# Patient Record
Sex: MALE | Race: WHITE | NOT HISPANIC OR LATINO | URBAN - METROPOLITAN AREA
[De-identification: names, ages, dates, MRNs, and addresses within clinical notes are randomized per-mention and may not be internally consistent; named-entity substitution may affect disease eponyms.]

---

## 2022-05-16 ENCOUNTER — INPATIENT (INPATIENT)
Facility: HOSPITAL | Age: 62
LOS: 0 days | Discharge: HOME | DRG: 292 | End: 2022-05-17
Attending: INTERNAL MEDICINE | Admitting: INTERNAL MEDICINE
Payer: COMMERCIAL

## 2022-05-16 VITALS
TEMPERATURE: 98 F | OXYGEN SATURATION: 95 % | WEIGHT: 210.1 LBS | DIASTOLIC BLOOD PRESSURE: 74 MMHG | SYSTOLIC BLOOD PRESSURE: 145 MMHG | HEIGHT: 73 IN | HEART RATE: 58 BPM | RESPIRATION RATE: 18 BRPM

## 2022-05-16 DIAGNOSIS — R06.00 DYSPNEA, UNSPECIFIED: ICD-10-CM

## 2022-05-16 LAB
ADD ON TEST-SPECIMEN IN LAB: SIGNIFICANT CHANGE UP
ALBUMIN SERPL ELPH-MCNC: 3.7 G/DL — SIGNIFICANT CHANGE UP (ref 3.3–5)
ALP SERPL-CCNC: 62 U/L — SIGNIFICANT CHANGE UP (ref 40–120)
ALT FLD-CCNC: 79 U/L — HIGH (ref 12–78)
ANION GAP SERPL CALC-SCNC: 8 MMOL/L — SIGNIFICANT CHANGE UP (ref 5–17)
APTT BLD: 30.6 SEC — SIGNIFICANT CHANGE UP (ref 27.5–35.5)
AST SERPL-CCNC: 77 U/L — HIGH (ref 15–37)
BASOPHILS # BLD AUTO: 0.02 K/UL — SIGNIFICANT CHANGE UP (ref 0–0.2)
BASOPHILS NFR BLD AUTO: 0.3 % — SIGNIFICANT CHANGE UP (ref 0–2)
BILIRUB SERPL-MCNC: 0.7 MG/DL — SIGNIFICANT CHANGE UP (ref 0.2–1.2)
BUN SERPL-MCNC: 22 MG/DL — SIGNIFICANT CHANGE UP (ref 7–23)
CALCIUM SERPL-MCNC: 9.1 MG/DL — SIGNIFICANT CHANGE UP (ref 8.5–10.1)
CHLORIDE SERPL-SCNC: 107 MMOL/L — SIGNIFICANT CHANGE UP (ref 96–108)
CO2 SERPL-SCNC: 25 MMOL/L — SIGNIFICANT CHANGE UP (ref 22–31)
CREAT SERPL-MCNC: 1.29 MG/DL — SIGNIFICANT CHANGE UP (ref 0.5–1.3)
EGFR: 63 ML/MIN/1.73M2 — SIGNIFICANT CHANGE UP
EOSINOPHIL # BLD AUTO: 0.02 K/UL — SIGNIFICANT CHANGE UP (ref 0–0.5)
EOSINOPHIL NFR BLD AUTO: 0.3 % — SIGNIFICANT CHANGE UP (ref 0–6)
GLUCOSE SERPL-MCNC: 105 MG/DL — HIGH (ref 70–99)
HCT VFR BLD CALC: 41.4 % — SIGNIFICANT CHANGE UP (ref 39–50)
HGB BLD-MCNC: 14 G/DL — SIGNIFICANT CHANGE UP (ref 13–17)
IMM GRANULOCYTES NFR BLD AUTO: 0.4 % — SIGNIFICANT CHANGE UP (ref 0–1.5)
INR BLD: 1.11 RATIO — SIGNIFICANT CHANGE UP (ref 0.88–1.16)
LYMPHOCYTES # BLD AUTO: 1.1 K/UL — SIGNIFICANT CHANGE UP (ref 1–3.3)
LYMPHOCYTES # BLD AUTO: 15.5 % — SIGNIFICANT CHANGE UP (ref 13–44)
MCHC RBC-ENTMCNC: 33 PG — SIGNIFICANT CHANGE UP (ref 27–34)
MCHC RBC-ENTMCNC: 33.8 GM/DL — SIGNIFICANT CHANGE UP (ref 32–36)
MCV RBC AUTO: 97.6 FL — SIGNIFICANT CHANGE UP (ref 80–100)
MONOCYTES # BLD AUTO: 0.74 K/UL — SIGNIFICANT CHANGE UP (ref 0–0.9)
MONOCYTES NFR BLD AUTO: 10.4 % — SIGNIFICANT CHANGE UP (ref 2–14)
NEUTROPHILS # BLD AUTO: 5.18 K/UL — SIGNIFICANT CHANGE UP (ref 1.8–7.4)
NEUTROPHILS NFR BLD AUTO: 73.1 % — SIGNIFICANT CHANGE UP (ref 43–77)
NT-PROBNP SERPL-SCNC: 1202 PG/ML — HIGH (ref 0–125)
PLATELET # BLD AUTO: 148 K/UL — LOW (ref 150–400)
POTASSIUM SERPL-MCNC: 4.1 MMOL/L — SIGNIFICANT CHANGE UP (ref 3.5–5.3)
POTASSIUM SERPL-SCNC: 4.1 MMOL/L — SIGNIFICANT CHANGE UP (ref 3.5–5.3)
PROT SERPL-MCNC: 7.5 GM/DL — SIGNIFICANT CHANGE UP (ref 6–8.3)
PROTHROM AB SERPL-ACNC: 12.9 SEC — SIGNIFICANT CHANGE UP (ref 10.5–13.4)
RAPID RVP RESULT: SIGNIFICANT CHANGE UP
RBC # BLD: 4.24 M/UL — SIGNIFICANT CHANGE UP (ref 4.2–5.8)
RBC # FLD: 13 % — SIGNIFICANT CHANGE UP (ref 10.3–14.5)
SARS-COV-2 RNA SPEC QL NAA+PROBE: SIGNIFICANT CHANGE UP
SODIUM SERPL-SCNC: 140 MMOL/L — SIGNIFICANT CHANGE UP (ref 135–145)
TROPONIN I, HIGH SENSITIVITY RESULT: 16.57 NG/L — SIGNIFICANT CHANGE UP
TROPONIN I, HIGH SENSITIVITY RESULT: 17.29 NG/L — SIGNIFICANT CHANGE UP
TROPONIN I, HIGH SENSITIVITY RESULT: 17.41 NG/L — SIGNIFICANT CHANGE UP
TROPONIN I, HIGH SENSITIVITY RESULT: 18.61 NG/L — SIGNIFICANT CHANGE UP
WBC # BLD: 7.09 K/UL — SIGNIFICANT CHANGE UP (ref 3.8–10.5)
WBC # FLD AUTO: 7.09 K/UL — SIGNIFICANT CHANGE UP (ref 3.8–10.5)

## 2022-05-16 PROCEDURE — 80048 BASIC METABOLIC PNL TOTAL CA: CPT

## 2022-05-16 PROCEDURE — 99285 EMERGENCY DEPT VISIT HI MDM: CPT

## 2022-05-16 PROCEDURE — 36415 COLL VENOUS BLD VENIPUNCTURE: CPT

## 2022-05-16 PROCEDURE — 99223 1ST HOSP IP/OBS HIGH 75: CPT

## 2022-05-16 PROCEDURE — 93010 ELECTROCARDIOGRAM REPORT: CPT | Mod: 76

## 2022-05-16 PROCEDURE — 93017 CV STRESS TEST TRACING ONLY: CPT

## 2022-05-16 PROCEDURE — 84484 ASSAY OF TROPONIN QUANT: CPT

## 2022-05-16 PROCEDURE — A9500: CPT

## 2022-05-16 PROCEDURE — G0378: CPT

## 2022-05-16 PROCEDURE — 93005 ELECTROCARDIOGRAM TRACING: CPT

## 2022-05-16 PROCEDURE — 80061 LIPID PANEL: CPT

## 2022-05-16 PROCEDURE — 86803 HEPATITIS C AB TEST: CPT

## 2022-05-16 PROCEDURE — 78452 HT MUSCLE IMAGE SPECT MULT: CPT

## 2022-05-16 PROCEDURE — 71045 X-RAY EXAM CHEST 1 VIEW: CPT | Mod: 26

## 2022-05-16 RX ORDER — ATORVASTATIN CALCIUM 80 MG/1
10 TABLET, FILM COATED ORAL AT BEDTIME
Refills: 0 | Status: DISCONTINUED | OUTPATIENT
Start: 2022-05-16 | End: 2022-05-17

## 2022-05-16 RX ORDER — BUPRENORPHINE AND NALOXONE 2; .5 MG/1; MG/1
1 TABLET SUBLINGUAL
Qty: 0 | Refills: 0 | DISCHARGE

## 2022-05-16 RX ORDER — FUROSEMIDE 40 MG
40 TABLET ORAL DAILY
Refills: 0 | Status: DISCONTINUED | OUTPATIENT
Start: 2022-05-16 | End: 2022-05-17

## 2022-05-16 RX ORDER — FENOFIBRATE,MICRONIZED 130 MG
145 CAPSULE ORAL DAILY
Refills: 0 | Status: DISCONTINUED | OUTPATIENT
Start: 2022-05-16 | End: 2022-05-17

## 2022-05-16 RX ORDER — METOPROLOL TARTRATE 50 MG
12.5 TABLET ORAL
Refills: 0 | Status: DISCONTINUED | OUTPATIENT
Start: 2022-05-16 | End: 2022-05-16

## 2022-05-16 RX ORDER — HYDROCHLOROTHIAZIDE 25 MG
25 TABLET ORAL DAILY
Refills: 0 | Status: DISCONTINUED | OUTPATIENT
Start: 2022-05-16 | End: 2022-05-17

## 2022-05-16 RX ORDER — LANOLIN ALCOHOL/MO/W.PET/CERES
3 CREAM (GRAM) TOPICAL AT BEDTIME
Refills: 0 | Status: DISCONTINUED | OUTPATIENT
Start: 2022-05-16 | End: 2022-05-17

## 2022-05-16 RX ORDER — METOPROLOL TARTRATE 50 MG
50 TABLET ORAL DAILY
Refills: 0 | Status: DISCONTINUED | OUTPATIENT
Start: 2022-05-16 | End: 2022-05-17

## 2022-05-16 RX ORDER — HYDRALAZINE HCL 50 MG
100 TABLET ORAL
Refills: 0 | Status: DISCONTINUED | OUTPATIENT
Start: 2022-05-16 | End: 2022-05-17

## 2022-05-16 RX ORDER — ONDANSETRON 8 MG/1
4 TABLET, FILM COATED ORAL EVERY 6 HOURS
Refills: 0 | Status: DISCONTINUED | OUTPATIENT
Start: 2022-05-16 | End: 2022-05-17

## 2022-05-16 RX ORDER — ACETAMINOPHEN 500 MG
650 TABLET ORAL EVERY 6 HOURS
Refills: 0 | Status: DISCONTINUED | OUTPATIENT
Start: 2022-05-16 | End: 2022-05-17

## 2022-05-16 RX ORDER — METOPROLOL TARTRATE 50 MG
100 TABLET ORAL AT BEDTIME
Refills: 0 | Status: DISCONTINUED | OUTPATIENT
Start: 2022-05-16 | End: 2022-05-17

## 2022-05-16 RX ORDER — BUPRENORPHINE AND NALOXONE 2; .5 MG/1; MG/1
1 TABLET SUBLINGUAL DAILY
Refills: 0 | Status: DISCONTINUED | OUTPATIENT
Start: 2022-05-16 | End: 2022-05-17

## 2022-05-16 RX ORDER — FUROSEMIDE 40 MG
20 TABLET ORAL ONCE
Refills: 0 | Status: COMPLETED | OUTPATIENT
Start: 2022-05-16 | End: 2022-05-16

## 2022-05-16 RX ORDER — LOSARTAN POTASSIUM 100 MG/1
100 TABLET, FILM COATED ORAL DAILY
Refills: 0 | Status: DISCONTINUED | OUTPATIENT
Start: 2022-05-16 | End: 2022-05-17

## 2022-05-16 RX ORDER — METOPROLOL TARTRATE 50 MG
1 TABLET ORAL
Qty: 0 | Refills: 0 | DISCHARGE

## 2022-05-16 RX ORDER — ENOXAPARIN SODIUM 100 MG/ML
40 INJECTION SUBCUTANEOUS EVERY 24 HOURS
Refills: 0 | Status: DISCONTINUED | OUTPATIENT
Start: 2022-05-16 | End: 2022-05-17

## 2022-05-16 RX ADMIN — Medication 3 MILLIGRAM(S): at 21:53

## 2022-05-16 RX ADMIN — Medication 650 MILLIGRAM(S): at 12:21

## 2022-05-16 RX ADMIN — Medication 650 MILLIGRAM(S): at 17:47

## 2022-05-16 RX ADMIN — ENOXAPARIN SODIUM 40 MILLIGRAM(S): 100 INJECTION SUBCUTANEOUS at 15:23

## 2022-05-16 RX ADMIN — Medication 20 MILLIGRAM(S): at 12:21

## 2022-05-16 RX ADMIN — ATORVASTATIN CALCIUM 10 MILLIGRAM(S): 80 TABLET, FILM COATED ORAL at 21:53

## 2022-05-16 RX ADMIN — Medication 100 MILLIGRAM(S): at 21:53

## 2022-05-16 NOTE — PATIENT PROFILE ADULT - FALL HARM RISK - UNIVERSAL INTERVENTIONS
Bed in lowest position, wheels locked, appropriate side rails in place/Call bell, personal items and telephone in reach/Instruct patient to call for assistance before getting out of bed or chair/Non-slip footwear when patient is out of bed/[place] to call system/Physically safe environment - no spills, clutter or unnecessary equipment/Purposeful Proactive Rounding/Room/bathroom lighting operational, light cord in reach

## 2022-05-16 NOTE — CONSULT NOTE ADULT - SUBJECTIVE AND OBJECTIVE BOX
60 yo male w/PMH of presents to the ED for SOB since last night, worsened this morning. Pt reports mild body aches over the weekend and some SOB last night which he notes worsened this morning. Also reports feeling anxious this morning. States SOB worse lying flat. His exercise capacity has not been impaired recently. Wife present and admits to ETOH and large meals that they enjoy. He aslo snores and night and possibly stops breathing. He will be adm to r/o CHF vs SOB as an anginal equivalent, he defenetly need s stress test before he leaves.  PCP aware of adm and the need for sleep study as outpt. (16 May 2022 14:11)    Admitted for dyspnea.    62 y/o w/     HTN  HLP, hyper TG  obesity - BMI = 30  no cardiac history    Reviewed symptoms w/ pt.  Pt reports onset of mild dyspne (4-5/10) today.  Reports orthopnea, no PND.  No LE edema.  no rapid weight gain ab distension.  Vague pressure type pain in chest on questioning  not related to exertion.  No palpitation, syncope.  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  Feels better after IV lasix dose & diuresis.  Feels back to baseline.    PAST MEDICAL AND SURGICAL HISTORY:  PAST MEDICAL & SURGICAL HISTORY:  HTN (hypertension)          ALLERGIES:  Allergies    penicillins (Unknown)    Intolerances        SOCIAL HISTORY:  Social History:  neg smoking,  drink probably more than normal social drinking, neg IDU (16 May 2022 14:11)      FAMILY  HISTORY:  no family history of cardiac disease.      MEDICATIONS:  OUTPATIENT:  Home Medications:  atorvastatin 10 mg oral tablet: 1 tab(s) orally once a day (16 May 2022 13:00)  buprenorphine-naloxone 2 mg-0.5 mg sublingual film: 0.25 (1/4) film(s) sublingual once a day  ***pt uses one-quarter of a film*** (16 May 2022 13:00)  fenofibrate 160 mg oral tablet: 1 tab(s) orally once a day (16 May 2022 13:00)  hydrALAZINE 100 mg oral tablet: 1 tab(s) orally 2 times a day (16 May 2022 13:00)  losartan-hydrochlorothiazide 100 mg-25 mg oral tablet: 1 tab(s) orally once a day (16 May 2022 13:00)  metoprolol succinate 50 mg oral tablet, extended release: 1 tab(s) orally once a day (in the morning) (16 May 2022 13:00)  metoprolol succinate 50 mg oral tablet, extended release: 2 tab(s) orally once a day (in the evening) (16 May 2022 13:00)  Multiple Vitamins oral tablet: 1 tab(s) orally once a day (16 May 2022 13:00)  Tylenol 500 mg oral tablet: 2 tab(s) orally every 6 hours as needed (16 May 2022 13:00)  Vitamin D3 400 intl units (10 mcg) oral tablet: 1 tab(s) orally once a day (16 May 2022 13:00)      INPATIENT:  MEDICATIONS  (STANDING):  atorvastatin 10 milliGRAM(s) Oral at bedtime  buprenorphine 2 mG/naloxone 0.5 mG SL Film 1 Film(s) SubLingual daily  enoxaparin Injectable 40 milliGRAM(s) SubCutaneous every 24 hours  fenofibrate Tablet 145 milliGRAM(s) Oral daily  furosemide   Injectable 40 milliGRAM(s) IV Push daily  hydrALAZINE 100 milliGRAM(s) Oral two times a day  hydrochlorothiazide 25 milliGRAM(s) Oral daily  losartan 100 milliGRAM(s) Oral daily  metoprolol succinate ER 50 milliGRAM(s) Oral daily  metoprolol succinate  milliGRAM(s) Oral at bedtime    MEDICATIONS  (PRN):  acetaminophen     Tablet .. 650 milliGRAM(s) Oral every 6 hours PRN Mild Pain (1 - 3)  ondansetron Injectable 4 milliGRAM(s) IV Push every 6 hours PRN Nausea and/or Vomiting    MEDICATIONS  (PRN):  acetaminophen     Tablet .. 650 milliGRAM(s) Oral every 6 hours PRN Mild Pain (1 - 3)  ondansetron Injectable 4 milliGRAM(s) IV Push every 6 hours PRN Nausea and/or Vomiting      REVIEW OF SYSTEMS:  ===============================  ===============================  CONSTITUTIONAL: No weakness, fevers or chills  EYES/ENT: No visual changes;  No vertigo or throat pain   NECK: No pain or stiffness  RESPIRATORY: No cough, wheezing, hemoptysis; No shortness of breath  CARDIOVASCULAR: No chest pain or palpitations  GASTROINTESTINAL: No abdominal or epigastric pain. No nausea, vomiting, or hematemesis;   No diarrhea or constipation. No melena or hematochezia.  GENITOURINARY: No dysuria, frequency or hematuria  NEUROLOGICAL: No numbness or weakness  SKIN: No itching, burning, rashes, or lesions   All other review of systems is negative unless indicated above    Vital Signs Last 24 Hrs  T(C): 36.9 (16 May 2022 16:30), Max: 37.2 (16 May 2022 12:43)  T(F): 98.5 (16 May 2022 16:30), Max: 99 (16 May 2022 12:43)  HR: 55 (16 May 2022 16:30) (55 - 58)  BP: 144/65 (16 May 2022 16:30) (142/75 - 145/74)  BP(mean): 95 (16 May 2022 13:44) (95 - 95)  RR: 18 (16 May 2022 16:30) (18 - 19)  SpO2: 95% (16 May 2022 16:30) (94% - 95%)    I&O's Summary    16 May 2022 07:01  -  16 May 2022 19:59  --------------------------------------------------------  IN: 0 mL / OUT: 320 mL / NET: -320 mL        I&O's Detail    16 May 2022 07:01  -  16 May 2022 19:59  --------------------------------------------------------  IN:  Total IN: 0 mL    OUT:    Voided (mL): 320 mL  Total OUT: 320 mL    Total NET: -320 mL          PHYSICAL EXAM:    Constitutional: NAD, awake and alert,   HEENT: PERR, EOMI,  No oral cyananosis.  Neck:  supple,  No JVD  Respiratory: Breath sounds are clear bilaterally, No wheezing, rales or rhonchi  Cardiovascular: S1 and S2, regular rate and rhythm, no Murmurs, gallops or rubs  Gastrointestinal: Bowel Sounds present, soft, nontender.   Extremities: No peripheral edema. No clubbing or cyanosis.  Vascular: 2+ peripheral pulses  Neurological: A/O x 3, no focal deficits  Musculoskeletal: no calf tenderness.  Skin: No rashes.    ===============================  ===============================  LABS:                         14.0   7.09  )-----------( 148      ( 16 May 2022 10:19 )             41.4     16 May 2022 10:19    140    |  107    |  22     ----------------------------<  105    4.1     |  25     |  1.29     Ca    9.1        16 May 2022 10:19    TPro  7.5    /  Alb  3.7    /  TBili  0.7    /  DBili  x      /  AST  77     /  ALT  79     /  AlkPhos  62     16 May 2022 10:19    PT/INR - ( 16 May 2022 10:19 )   PT: 12.9 sec;   INR: 1.11 ratio         PTT - ( 16 May 2022 10:19 )  PTT:30.6 sec    THYROID STUDIES:    ===============================  ===============================  CARDIAC BIOMARKERS:  -------  -BNP VALUES:  05-16 @ 10:19  Pro Bnp 1202    -------  -TROPONIN VALUES:   Troponin I, High Sensitivity Result: 18.61 ng/L (05-16-22 @ 17:42)  Troponin I, High Sensitivity Result: 17.29 ng/L (05-16-22 @ 14:03)  Troponin I, High Sensitivity Result: 17.41 ng/L (05-16-22 @ 10:19)    ===============================  ===============================  EKG: NSR, no ischemic changes.    TELE: NSR  ===============================  RADIOLOGY:      ACC: [id number] EXAM:  XR CHEST PORTABLE ROUTINE 1V                          PROCEDURE DATE:  05/16/2022          INTERPRETATION:  Portable chest radiograph    CLINICAL INFORMATION: Dyspnea, shortness of breath.    TECHNIQUE:  Portable  AP chest radiograph.    COMPARISON: 4/11/2014 chest radiograph .    FINDINGS:  CATHETERS AND TUBES: None    PULMONARY: The visualized lungs are clear of airspace consolidations or   effusions.   No pneumothorax.    HEART/VASCULAR: The heart is mildly enlarged in transverse diameter.    BONES: Visualized osseous structures are intact.    IMPRESSION: Mild cardiomegaly.  No radiographic evidence of active chest disease.    --- End of Report ---    MARTA VINCENT MD; Attending Radiologist  This document has been electronically signed. May 16 2022  3:43PM    ===============================    Valdo Chaney M.D.  Cardiology, [facility]  Cell: [phone]  Office:   [phone] ([facility] Office)  [phone] ([facility] Office)    ===============================

## 2022-05-16 NOTE — H&P ADULT - NSHPPHYSICALEXAM_GEN_ALL_CORE
Vital Signs Last 24 Hrs  T(C): 37.1 (16 May 2022 13:44), Max: 37.2 (16 May 2022 12:43)  T(F): 98.8 (16 May 2022 13:44), Max: 99 (16 May 2022 12:43)  HR: 56 (16 May 2022 13:44) (56 - 58)  BP: 144/77 (16 May 2022 13:44) (142/75 - 145/74)  BP(mean): 95 (16 May 2022 13:44) (95 - 95)  RR: 18 (16 May 2022 13:44) (18 - 19)  SpO2: 95% (16 May 2022 13:44) (94% - 95%)    · CONSTITUTIONAL: Appears mildly anxious, awake, alert, oriented to person, place, time/situation and in no apparent distress.  · ENMT: Airway patent, Nasal mucosa clear. Mouth with normal mucosa. Throat has no vesicles, no oropharyngeal exudates and uvula is midline.  · EYES: Clear bilaterally, pupils equal, round and reactive to light.  · CARDIAC: Normal rate, regular rhythm.  Heart sounds S1, S2.  No murmurs, rubs or gallops.  · RESPIRATORY: Scattered rhonchi  · GASTROINTESTINAL: Abdomen soft, non-tender, no guarding.  · MUSCULOSKELETAL: Spine appears normal, range of motion is not limited, no muscle or joint tenderness  · NEUROLOGICAL: Alert and oriented, no focal deficits, no motor or sensory deficits.  · SKIN: Skin normal color for race, warm, dry and intact. No evidence of rash.

## 2022-05-16 NOTE — ED PROVIDER NOTE - CPE EDP NEURO NORM
Follow up:    Follow up per Watch and Wait Protocol:   Completed CRT: 4/12/19    Flexible sigmoidoscopy   ? Every 2 months for 6 months: Completed  ? Every 3 months for 3 years: Until 4/2022 Due 5/2021  ? Every 6 months for 5 years: Until 4/2024  ? Every year for 10 years: Until 4/2029       3T MRI of pelvis  ? 6-8 weeks after CRT:  ? Every 4 months for 2 years: Until 4/2021 DUE 5/2021  ? Every 6 months for 2 years: Until 4/2023  ? Yearly for 2 years: Until 4/2025       CT CAP  ? Every year for 6-8 years: Until 4/2027 Due 5/2021       Colonoscopy   ? Due 10/2021       Please call with any questions or concerns regarding your clinic visit today.    It is a pleasure being involved in your health care.    Contacts post-consultation depending on your need:    Radiology Appointments [phone]    Schedule Clinic Appointments [phone] # 1   M-F 7:30 - 5 pm    ZOFIA Olson [phone]    Clinic Fax Number [phone]    Surgery Scheduling [phone]    My Chart is available 24 hours a day and is a secure way to access your records and communicate with your care team.  I strongly recommend signing up if you haven't already done so, if you are comfortable with computers.  If you would like to inquire about this or are having problems with My Chart access, you may call [phone] or go online at [email or web address].  Please allow at least 24 hours for a response and extra time on weekends and Holidays.       normal...

## 2022-05-16 NOTE — ED ADULT NURSE NOTE - NSIMPLEMENTINTERV_GEN_ALL_ED
Implemented All Universal Safety Interventions:  [place] to call system. Call bell, personal items and telephone within reach. Instruct patient to call for assistance. Room bathroom lighting operational. Non-slip footwear when patient is off stretcher. Physically safe environment: no spills, clutter or unnecessary equipment. Stretcher in lowest position, wheels locked, appropriate side rails in place.

## 2022-05-16 NOTE — H&P ADULT - HISTORY OF PRESENT ILLNESS
62 yo male w/PMH of presents to the ED for SOB since last night, worsened this morning. Pt reports mild body aches over the weekend and some SOB last night which he notes worsened this morning. Also reports feeling anxious this morning. States SOB worse lying flat. His exercise capacity has not been impaired recently. Wife present and admits to ETOH and large meals that they enjoy. He aslo snores and night and possibly stops breathing. He will be adm to r/o CHF vs SOB as an anginal equivalent, he defenetly need s stress test before he leaves.  PCP aware of adm and the need for sleep study as outpt.

## 2022-05-16 NOTE — H&P ADULT - NSHPLABSRESULTS_GEN_ALL_CORE
* SOB, chest tightness could be anginal equivalent  r/o MI will need stress test  check O2   pending CTA  start diuretic IV  prn inhaler    * HTN         * Needs [place] work up

## 2022-05-16 NOTE — ED PROVIDER NOTE - OBJECTIVE STATEMENT
60 yo male w/PMH of presents to the ED for SOB since last night, worsened this morning. Pt reports mild body aches over the weekend and some SOB last night which he notes worsened this morning. Also reports feeling anxious this morning. States SOB worse lying flat. Reports negative stress test 25 years ago. Denies fever/chills, cough, CP, abdominal pain, headache, N/V/D or back pain. Denies sick contacts. Pt is vaccinated against COVID.

## 2022-05-16 NOTE — PHARMACOTHERAPY INTERVENTION NOTE - COMMENTS
Medication history complete, reviewed medication with patient and confirmed with DrFirst.   Patient confirms that he's using 1/4 of a film once daily.

## 2022-05-16 NOTE — ED ADULT NURSE NOTE - OBJECTIVE STATEMENT
patient ambulatory to ED c/o SOB since this morning.  patient notes cough x few days with body aches.  had SOB last night and this morning while laying flat.

## 2022-05-16 NOTE — ED PROVIDER NOTE - CONSTITUTIONAL, MLM
Appears mildly anxious, awake, alert, oriented to person, place, time/situation and in no apparent distress. normal...

## 2022-05-16 NOTE — CONSULT NOTE ADULT - PROBLEM SELECTOR RECOMMENDATION 9
Etiology unclear.  Given orthopnea, elevated BNP & risk factors may have HFpEF.  Cannot exclude atypical anginal presentation.  Echo & exercise nuclear stress test ordered - chemical stress if treadmill   still not working.  Agree w/ lasix but consider switching to po tommorrow.

## 2022-05-17 ENCOUNTER — TRANSCRIPTION ENCOUNTER (OUTPATIENT)
Age: 62
End: 2022-05-17

## 2022-05-17 VITALS — HEART RATE: 85 BPM | DIASTOLIC BLOOD PRESSURE: 46 MMHG | SYSTOLIC BLOOD PRESSURE: 123 MMHG

## 2022-05-17 DIAGNOSIS — I10 ESSENTIAL (PRIMARY) HYPERTENSION: ICD-10-CM

## 2022-05-17 LAB
ANION GAP SERPL CALC-SCNC: 7 MMOL/L — SIGNIFICANT CHANGE UP (ref 5–17)
BUN SERPL-MCNC: 18 MG/DL — SIGNIFICANT CHANGE UP (ref 7–23)
CALCIUM SERPL-MCNC: 9.6 MG/DL — SIGNIFICANT CHANGE UP (ref 8.5–10.1)
CHLORIDE SERPL-SCNC: 105 MMOL/L — SIGNIFICANT CHANGE UP (ref 96–108)
CHOLEST SERPL-MCNC: 179 MG/DL — SIGNIFICANT CHANGE UP
CO2 SERPL-SCNC: 26 MMOL/L — SIGNIFICANT CHANGE UP (ref 22–31)
CREAT SERPL-MCNC: 1.36 MG/DL — HIGH (ref 0.5–1.3)
EGFR: 60 ML/MIN/1.73M2 — SIGNIFICANT CHANGE UP
GLUCOSE SERPL-MCNC: 106 MG/DL — HIGH (ref 70–99)
HCV AB S/CO SERPL IA: 0.11 S/CO — SIGNIFICANT CHANGE UP (ref 0–0.99)
HCV AB SERPL-IMP: SIGNIFICANT CHANGE UP
HDLC SERPL-MCNC: 37 MG/DL — LOW
LIPID PNL WITH DIRECT LDL SERPL: 93 MG/DL — SIGNIFICANT CHANGE UP
NON HDL CHOLESTEROL: 142 MG/DL — HIGH
POTASSIUM SERPL-MCNC: 3.8 MMOL/L — SIGNIFICANT CHANGE UP (ref 3.5–5.3)
POTASSIUM SERPL-SCNC: 3.8 MMOL/L — SIGNIFICANT CHANGE UP (ref 3.5–5.3)
SODIUM SERPL-SCNC: 138 MMOL/L — SIGNIFICANT CHANGE UP (ref 135–145)
TRIGL SERPL-MCNC: 247 MG/DL — HIGH

## 2022-05-17 PROCEDURE — 93016 CV STRESS TEST SUPVJ ONLY: CPT

## 2022-05-17 PROCEDURE — 99233 SBSQ HOSP IP/OBS HIGH 50: CPT

## 2022-05-17 PROCEDURE — 93018 CV STRESS TEST I&R ONLY: CPT

## 2022-05-17 PROCEDURE — 99239 HOSP IP/OBS DSCHRG MGMT >30: CPT

## 2022-05-17 PROCEDURE — 78452 HT MUSCLE IMAGE SPECT MULT: CPT | Mod: 26

## 2022-05-17 RX ORDER — BUPRENORPHINE AND NALOXONE 2; .5 MG/1; MG/1
0.25 TABLET SUBLINGUAL
Qty: 0 | Refills: 0 | DISCHARGE

## 2022-05-17 RX ORDER — METOPROLOL TARTRATE 50 MG
2 TABLET ORAL
Qty: 0 | Refills: 0 | DISCHARGE

## 2022-05-17 RX ORDER — LOSARTAN POTASSIUM 100 MG/1
1 TABLET, FILM COATED ORAL
Qty: 30 | Refills: 0
Start: 2022-05-17 | End: 2022-06-15

## 2022-05-17 RX ORDER — REGADENOSON 0.08 MG/ML
0.4 INJECTION, SOLUTION INTRAVENOUS ONCE
Refills: 0 | Status: COMPLETED | OUTPATIENT
Start: 2022-05-17 | End: 2022-05-17

## 2022-05-17 RX ORDER — FUROSEMIDE 40 MG
1 TABLET ORAL
Qty: 0 | Refills: 0 | DISCHARGE

## 2022-05-17 RX ORDER — FENOFIBRATE,MICRONIZED 130 MG
1 CAPSULE ORAL
Qty: 0 | Refills: 0 | DISCHARGE

## 2022-05-17 RX ORDER — LOSARTAN/HYDROCHLOROTHIAZIDE 100MG-25MG
1 TABLET ORAL
Qty: 0 | Refills: 0 | DISCHARGE

## 2022-05-17 RX ORDER — HYDRALAZINE HCL 50 MG
1 TABLET ORAL
Qty: 0 | Refills: 0 | DISCHARGE

## 2022-05-17 RX ORDER — CHOLECALCIFEROL (VITAMIN D3) 125 MCG
1 CAPSULE ORAL
Qty: 0 | Refills: 0 | DISCHARGE

## 2022-05-17 RX ORDER — METOPROLOL TARTRATE 50 MG
1 TABLET ORAL
Qty: 0 | Refills: 0 | DISCHARGE

## 2022-05-17 RX ORDER — ATORVASTATIN CALCIUM 80 MG/1
1 TABLET, FILM COATED ORAL
Qty: 0 | Refills: 0 | DISCHARGE

## 2022-05-17 RX ORDER — FUROSEMIDE 40 MG
1 TABLET ORAL
Qty: 30 | Refills: 0
Start: 2022-05-17 | End: 2022-06-15

## 2022-05-17 RX ORDER — ACETAMINOPHEN 500 MG
2 TABLET ORAL
Qty: 0 | Refills: 0 | DISCHARGE

## 2022-05-17 RX ADMIN — BUPRENORPHINE AND NALOXONE 1 FILM(S): 2; .5 TABLET SUBLINGUAL at 10:51

## 2022-05-17 RX ADMIN — LOSARTAN POTASSIUM 100 MILLIGRAM(S): 100 TABLET, FILM COATED ORAL at 10:52

## 2022-05-17 RX ADMIN — Medication 145 MILLIGRAM(S): at 10:52

## 2022-05-17 RX ADMIN — REGADENOSON 0.4 MILLIGRAM(S): 0.08 INJECTION, SOLUTION INTRAVENOUS at 09:15

## 2022-05-17 RX ADMIN — Medication 50 MILLIGRAM(S): at 10:51

## 2022-05-17 RX ADMIN — Medication 100 MILLIGRAM(S): at 10:51

## 2022-05-17 RX ADMIN — Medication 25 MILLIGRAM(S): at 10:51

## 2022-05-17 NOTE — DISCHARGE NOTE NURSING/CASE MANAGEMENT/SOCIAL WORK - NSDCFUADDAPPT_GEN_ALL_CORE_FT
Follow-up appointment with Dr. Dickson    Day: Monday  Date: 5/23/22  Time: 10:30am  [phone]

## 2022-05-17 NOTE — PROGRESS NOTE ADULT - SUBJECTIVE AND OBJECTIVE BOX
62 yo male w/PMH of presents to the ED for SOB since last night, worsened this morning. Pt reports mild body aches over the weekend and some SOB last night which he notes worsened this morning. Also reports feeling anxious this morning. States SOB worse lying flat. His exercise capacity has not been impaired recently. Wife present and admits to ETOH and large meals that they enjoy. He aslo snores and night and possibly stops breathing. He will be adm to r/o CHF vs SOB as an anginal equivalent, he defenetly need s stress test before he leaves.  PCP aware of adm and the need for sleep study as outpt. (16 May 2022 14:11)    Admitted for dyspnea.    60 y/o w/     HTN  HLP, hyper TG  obesity - BMI = 30  no cardiac history    Reviewed symptoms w/ pt.  Pt reports onset of mild dyspne (4-5/10) today.  Reports orthopnea, no PND.  No LE edema.  no rapid weight gain ab distension.  Vague pressure type pain in chest on questioning  not related to exertion.  No palpitation, syncope.  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  Feels better after IV lasix dose & diuresis.  Feels back to baseline.    5/17/22  Patient is feeling fine , his sob does not get worse with exertion, does have seasonal allergies     PAST MEDICAL & SURGICAL HISTORY:  HTN (hypertension)          ALLERGIES:  Allergies    penicillins (Unknown)    Intolerances        SOCIAL HISTORY:  Social History:  neg smoking,  drink probably more than normal social drinking, neg IDU (16 May 2022 14:11)      FAMILY  HISTORY:  no family history of cardiac disease.      MEDICATIONS:  OUTPATIENT:  Home Medications:  atorvastatin 10 mg oral tablet: 1 tab(s) orally once a day (16 May 2022 13:00)  buprenorphine-naloxone 2 mg-0.5 mg sublingual film: 0.25 (1/4) film(s) sublingual once a day  ***pt uses one-quarter of a film*** (16 May 2022 13:00)  fenofibrate 160 mg oral tablet: 1 tab(s) orally once a day (16 May 2022 13:00)  hydrALAZINE 100 mg oral tablet: 1 tab(s) orally 2 times a day (16 May 2022 13:00)  losartan-hydrochlorothiazide 100 mg-25 mg oral tablet: 1 tab(s) orally once a day (16 May 2022 13:00)  metoprolol succinate 50 mg oral tablet, extended release: 1 tab(s) orally once a day (in the morning) (16 May 2022 13:00)  metoprolol succinate 50 mg oral tablet, extended release: 2 tab(s) orally once a day (in the evening) (16 May 2022 13:00)  Multiple Vitamins oral tablet: 1 tab(s) orally once a day (16 May 2022 13:00)  Tylenol 500 mg oral tablet: 2 tab(s) orally every 6 hours as needed (16 May 2022 13:00)  Vitamin D3 400 intl units (10 mcg) oral tablet: 1 tab(s) orally once a day (16 May 2022 13:00)      MEDICATIONS  (STANDING):  atorvastatin 10 milliGRAM(s) Oral at bedtime  buprenorphine 2 mG/naloxone 0.5 mG SL Film 1 Film(s) SubLingual daily  enoxaparin Injectable 40 milliGRAM(s) SubCutaneous every 24 hours  fenofibrate Tablet 145 milliGRAM(s) Oral daily  furosemide   Injectable 40 milliGRAM(s) IV Push daily  hydrALAZINE 100 milliGRAM(s) Oral two times a day  hydrochlorothiazide 25 milliGRAM(s) Oral daily  losartan 100 milliGRAM(s) Oral daily  melatonin 3 milliGRAM(s) Oral at bedtime  metoprolol succinate ER 50 milliGRAM(s) Oral daily  metoprolol succinate  milliGRAM(s) Oral at bedtime  regadenoson Injectable 0.4 milliGRAM(s) IV Push once    MEDICATIONS  (PRN):  acetaminophen     Tablet .. 650 milliGRAM(s) Oral every 6 hours PRN Mild Pain (1 - 3)  ondansetron Injectable 4 milliGRAM(s) IV Push every 6 hours PRN Nausea and/or Vomiting    REVIEW OF SYSTEMS:  ===============================  ===============================  CONSTITUTIONAL: No weakness, fevers or chills  EYES/ENT: No visual changes;  No vertigo or throat pain   NECK: No pain or stiffness  RESPIRATORY: No cough, wheezing, hemoptysis; No shortness of breath  CARDIOVASCULAR: No chest pain or palpitations  GASTROINTESTINAL: No abdominal or epigastric pain. No nausea, vomiting, or hematemesis;   No diarrhea or constipation. No melena or hematochezia.  GENITOURINARY: No dysuria, frequency or hematuria  NEUROLOGICAL: No numbness or weakness  SKIN: No itching, burning, rashes, or lesions   All other review of systems is negative unless indicated above    Vital Signs Last 24 Hrs  T(C): 36.9 (16 May 2022 21:51), Max: 37.2 (16 May 2022 12:43)  T(F): 98.5 (16 May 2022 21:51), Max: 99 (16 May 2022 12:43)  HR: 57 (16 May 2022 21:51) (55 - 57)  BP: 149/67 (16 May 2022 21:51) (142/75 - 149/67)  BP(mean): 95 (16 May 2022 13:44) (95 - 95)  RR: 18 (16 May 2022 21:51) (18 - 19)  SpO2: 96% (16 May 2022 21:51) (94% - 96%)    I&O's Summary    16 May 2022 07:01  -  17 May 2022 07:00  --------------------------------------------------------  IN: 0 mL / OUT: 320 mL / NET: -320 mL    PHYSICAL EXAM:    Constitutional: NAD, awake and alert,   HEENT: PERR, EOMI,  No oral cyananosis.  Neck:  supple,  No JVD  Respiratory: Breath sounds are clear bilaterally, No wheezing, rales or rhonchi  Cardiovascular: S1 and S2, regular rate and rhythm, no Murmurs, gallops or rubs  Gastrointestinal: Bowel Sounds present, soft, nontender.   Extremities: No peripheral edema. No clubbing or cyanosis.  Vascular: 2+ peripheral pulses  Neurological: A/O x 3, no focal deficits  Musculoskeletal: no calf tenderness.  Skin: No rashes.    ===============================  ===============================  LABS:                                    14.0   7.09  )-----------( 148      ( 16 May 2022 10:19 )             41.4     05-17    138  |  105  |  18  ----------------------------<  106<H>  3.8   |  26  |  1.36<H>    Ca    9.6      17 May 2022 08:18    TPro  7.5  /  Alb  3.7  /  TBili  0.7  /  DBili  x   /  AST  77<H>  /  ALT  79<H>  /  AlkPhos  62  05-16        LIVER FUNCTIONS - ( 16 May 2022 10:19 )  Alb: 3.7 g/dL / Pro: 7.5 gm/dL / ALK PHOS: 62 U/L / ALT: 79 U/L / AST: 77 U/L / GGT: x           PT/INR - ( 16 May 2022 10:19 )   PT: 12.9 sec;   INR: 1.11 ratio         PTT - ( 16 May 2022 10:19 )  PTT:30.6 sec          - TroponinI hsT: <-16.57, <-18.61, <-17.29, <-17.41===============================  ===============================  CARDIAC BIOMARKERS:  -------  -BNP VALUES:  05-16 @ 10:19  Pro Bnp 1202    -------  -TROPONIN VALUES:   Troponin I, High Sensitivity Result: 18.61 ng/L (05-16-22 @ 17:42)  Troponin I, High Sensitivity Result: 17.29 ng/L (05-16-22 @ 14:03)  Troponin I, High Sensitivity Result: 17.41 ng/L (05-16-22 @ 10:19)    ===============================  ===============================  EKG: NSR, no ischemic changes.    TELE: NSR  ===============================  RADIOLOGY:      ACC: [id number] EXAM:  XR CHEST PORTABLE ROUTINE 1V                          PROCEDURE DATE:  05/16/2022          INTERPRETATION:  Portable chest radiograph    CLINICAL INFORMATION: Dyspnea, shortness of breath.    TECHNIQUE:  Portable  AP chest radiograph.    COMPARISON: 4/11/2014 chest radiograph .    FINDINGS:  CATHETERS AND TUBES: None    PULMONARY: The visualized lungs are clear of airspace consolidations or   effusions.   No pneumothorax.    HEART/VASCULAR: The heart is mildly enlarged in transverse diameter.    BONES: Visualized osseous structures are intact.    IMPRESSION: Mild cardiomegaly.  No radiographic evidence of active chest disease.    --- End of Report ---    MARTA VINCENT MD; Attending Radiologist  This document has been electronically signed. May 16 2022  3:43PM    ===============================    Valdo Chaney M.D.  Cardiology, [facility]  Cell: [phone]  Office:   [phone] ([facility] Office)  [phone] ([facility] Office)    ===============================

## 2022-05-17 NOTE — PROGRESS NOTE ADULT - PROBLEM SELECTOR PLAN 1
multifactorial  , probably hypertensive heart disease minimal elevated BNP , ? allergies  negative troponin ,  feeling better ,     change lasix to po 20 mg po daily , low salt diet , follow up echo , stress test

## 2022-05-17 NOTE — DISCHARGE NOTE NURSING/CASE MANAGEMENT/SOCIAL WORK - NSDCPEFALRISK_GEN_ALL_CORE
For information on Fall & Injury Prevention, visit: [email or web address] OR  [email or web address] OR  https://www.cdc.gov/steadi/patient.html

## 2022-05-17 NOTE — DISCHARGE NOTE NURSING/CASE MANAGEMENT/SOCIAL WORK - PATIENT PORTAL LINK FT
You can access the FollowMyHealth Patient Portal offered by [facility] by registering at the following website: [email or web address]. By joining [employer]’s FollowMyHealth portal, you will also be able to view your health information using other applications (apps) compatible with our system.

## 2022-05-17 NOTE — DISCHARGE NOTE PROVIDER - CARE PROVIDER_API CALL
Valdo Chaney)  Cardiology  [address] [place] [address]  Phone: [phone]  Fax: [phone]  Follow Up Time:

## 2022-05-17 NOTE — DISCHARGE NOTE PROVIDER - HOSPITAL COURSE
PHYSICAL EXAM:    Daily     Daily Weight in k.7 (17 May 2022 05:52)    Vital Signs Last 24 Hrs  T(C): 36.9 (16 May 2022 21:51), Max: 37.2 (16 May 2022 12:43)  T(F): 98.5 (16 May 2022 21:51), Max: 99 (16 May 2022 12:43)  HR: 57 (16 May 2022 21:51) (55 - 57)  BP: 149/67 (16 May 2022 21:51) (142/75 - 149/67)  BP(mean): 95 (16 May 2022 13:44) (95 - 95)  RR: 18 (16 May 2022 21:51) (18 - 19)  SpO2: 96% (16 May 2022 21:51) (94% - 96%)    Constitutional: Well  appearing  HEENT: Atraumatic, TRACE, Normal, No congestion  Respiratory: Breath Sounds normal, no rhonchi/wheeze  Cardiovascular: N S1S2;   Gastrointestinal: Abdomen soft, non tender, Bowel Sounds present  Extremities: No edema, peripheral pulses present  Neurological: AAO x 3, no gross focal motor deficits  Skin: Non cellulitic, no rash, ulcers  Lymph Nodes: No lymphadenopathy noted  Back: No CVA tenderness   Musculoskeletal: non tender  Breasts: Deferred  Genitourinary: deferred  Rectal: Deferred     * SOB, chest tightness could be anginal equivalent  r/o MI will need stress test; No MI  check O2 , normal  neg CTA  start diuretic IV lasix; switch to oral 20 mg daily for possible HFpEF  stress test neg.   f/u BMP in 1-2 days  f/u echo with Dr. Chaney in 1-2 days    * HTN : cont ARB        * Needs [place] work up as out pt    d/c home    time spent 43 min

## 2022-05-17 NOTE — DISCHARGE NOTE PROVIDER - NSDCCPCAREPLAN_GEN_ALL_CORE_FT
PRINCIPAL DISCHARGE DIAGNOSIS  Diagnosis: Dyspnea  Assessment and Plan of Treatment: HFpEF  start lasix 20 mg orally daily  f/u with Dr. Carrillo for echo and further management in 1-2 days  check BMP in 1-2 days  normal stress test  loose weight  low salt low fat low cholesterol diet      SECONDARY DISCHARGE DIAGNOSES  Diagnosis: Creatinine elevation  Assessment and Plan of Treatment: mild  1.36  check BMP in 1-2 days

## 2022-05-17 NOTE — CHART NOTE - NSCHARTNOTEFT_GEN_A_CORE
Regadenoson Stress test    Indication : sob       Lexiscan (Regadenoson) 0.5mg/5mL was administered through a peripheral IV within 10 seconds followed by 5mL saline flush.  The radiotracer was then injected by the nuclear technologist and the patient was monitored with continuous ECG and 12 lead ECGs every 1 minute for 6 minutes.    Resting ECG: Sinus bradycardia    Stress ECG:  No ischemic ECG changes with administration of Regadenoson.  Arrhythmia:  none   BP Response: Appropriate  HR Response:  Appropriate  Symptoms: none     Conclusion:  Normal ECG response to pharmacologic stress with Regadenoson.  Please see separate nuclear stress report from myocardial perfusion SPECT findings.

## 2022-05-17 NOTE — PROGRESS NOTE ADULT - PROBLEM SELECTOR PLAN 2
mild elevated , on multiple medication , encourage patient to follow low salt diet , to be compliant to medication ,

## 2022-05-17 NOTE — DISCHARGE NOTE PROVIDER - NSDCMRMEDTOKEN_GEN_ALL_CORE_FT
atorvastatin 10 mg oral tablet: 1 tab(s) orally once a day  buprenorphine-naloxone 2 mg-0.5 mg sublingual film: 0.25 (1/4) film(s) sublingual once a day  ***pt uses one-quarter of a film***  fenofibrate 160 mg oral tablet: 1 tab(s) orally once a day  hydrALAZINE 100 mg oral tablet: 1 tab(s) orally 2 times a day  Lasix 20 mg oral tablet: 1 tab(s) orally once a day  losartan 100 mg oral tablet: 1 tab(s) orally once a day  metoprolol succinate 50 mg oral tablet, extended release: 1 tab(s) orally once a day (in the morning)  Multiple Vitamins oral tablet: 1 tab(s) orally once a day  Vitamin D3 400 intl units (10 mcg) oral tablet: 1 tab(s) orally once a day

## 2022-05-31 DIAGNOSIS — I50.30 UNSPECIFIED DIASTOLIC (CONGESTIVE) HEART FAILURE: ICD-10-CM

## 2022-05-31 DIAGNOSIS — E78.5 HYPERLIPIDEMIA, UNSPECIFIED: ICD-10-CM

## 2022-05-31 DIAGNOSIS — I13.0 HYPERTENSIVE HEART AND CHRONIC KIDNEY DISEASE WITH HEART FAILURE AND STAGE 1 THROUGH STAGE 4 CHRONIC KIDNEY DISEASE, OR UNSPECIFIED CHRONIC KIDNEY DISEASE: ICD-10-CM

## 2022-05-31 DIAGNOSIS — E78.1 PURE HYPERGLYCERIDEMIA: ICD-10-CM

## 2022-05-31 DIAGNOSIS — N18.2 CHRONIC KIDNEY DISEASE, STAGE 2 (MILD): ICD-10-CM

## 2022-05-31 DIAGNOSIS — E66.9 OBESITY, UNSPECIFIED: ICD-10-CM

## 2022-05-31 DIAGNOSIS — I20.9 ANGINA PECTORIS, UNSPECIFIED: ICD-10-CM

## 2022-12-28 PROBLEM — I10 ESSENTIAL (PRIMARY) HYPERTENSION: Chronic | Status: ACTIVE | Noted: 2022-05-16

## 2022-12-29 ENCOUNTER — APPOINTMENT (OUTPATIENT)
Dept: VASCULAR SURGERY | Facility: CLINIC | Age: 62
End: 2022-12-29
Payer: COMMERCIAL

## 2022-12-29 VITALS
SYSTOLIC BLOOD PRESSURE: 133 MMHG | HEART RATE: 65 BPM | BODY MASS INDEX: 28.49 KG/M2 | DIASTOLIC BLOOD PRESSURE: 70 MMHG | HEIGHT: 73 IN | WEIGHT: 215 LBS

## 2022-12-29 PROCEDURE — 99204 OFFICE O/P NEW MOD 45 MIN: CPT

## 2022-12-29 NOTE — HISTORY OF PRESENT ILLNESS
[FreeTextEntry1] : 62-year-old white male with past medical history significant for hypertension treated for approximately 15 years, hypercholesterolemia treated for 15 years, lumbosacral spine disease status post lower back surgery in November 2021, presents for evaluation of a painful tender right proximal medial posterior calf beginning approximately 4 days ago.  He denied associated edema of this leg.  He then went for evaluation at his PMD and a venous duplex was performed that demonstrated superficial phlebitis of the right greater saphenous vein as it travels within the right calf.  There is no evidence of deep vein thrombosis noted on the venous duplex study.\par \par The patient denies trauma to this area, recent long car ride or flight, family history of thromboembolic events, prior deep or superficial thrombophlebitis.  He denies shortness of breath, dyspnea, or pleuritic type chest pain.

## 2022-12-29 NOTE — ASSESSMENT
[FreeTextEntry1] : 62-year-old with episode of superficial thrombophlebitis of the right calf greater saphenous vein without antecedent trauma or prolonged immobilization secondary to a long car ride or flight.  I noted my findings to the patient and that I would recommend conservative management with warm packs, stockings, and anti-inflammatory agents.  In addition, I noted that should he develop edema of this leg or progression of pain and tenderness above the knee he should contact me immediately.\par \par I performed a venous duplex of the right leg and found the greater saphenous vein but for a relatively small segment to be totally compressible.\par \par He will follow-up with me in approximately 1 week and I will make further recommendations at that time.

## 2022-12-29 NOTE — PHYSICAL EXAM
[Normal Thyroid] : the thyroid was normal [Normal Breath Sounds] : Normal breath sounds [Normal Heart Sounds] : normal heart sounds [Alert] : alert [Oriented to Person] : oriented to person [Oriented to Place] : oriented to place [Oriented to Time] : oriented to time [JVD] : no jugular venous distention  [Carotid Bruits] : no carotid bruits [Abdomen Masses] : No abdominal masses [Abdomen Tenderness] : ~T ~M No abdominal tenderness [de-identified] : well developed [de-identified] : HEENT, PERRLA, EOMi, sclerae anicteric, conjunctivae pink and moist [FreeTextEntry1] : 2+ femoral, popliteal, dorsalis pedis pulses bilaterally; both feet pink, warm, and well-perfused appearing without ulcers, cyanosis, or gangrenous changes; no edema bilaterally; no significant calf tenderness but for the proximal right posterior medial calf with small phlebitic segment palpable; no Homans' sign bilaterally [de-identified] : Diastases recti

## 2023-01-11 ENCOUNTER — APPOINTMENT (OUTPATIENT)
Dept: VASCULAR SURGERY | Facility: CLINIC | Age: 63
End: 2023-01-11
Payer: COMMERCIAL

## 2023-01-11 VITALS
HEART RATE: 65 BPM | DIASTOLIC BLOOD PRESSURE: 74 MMHG | BODY MASS INDEX: 27.83 KG/M2 | SYSTOLIC BLOOD PRESSURE: 138 MMHG | HEIGHT: 73 IN | WEIGHT: 210 LBS

## 2023-01-11 DIAGNOSIS — I80.01 PHLEBITIS AND THROMBOPHLEBITIS OF SUPERFICIAL VESSELS OF RIGHT LOWER EXTREMITY: ICD-10-CM

## 2023-01-11 PROCEDURE — 99212 OFFICE O/P EST SF 10 MIN: CPT

## 2023-01-11 NOTE — HISTORY OF PRESENT ILLNESS
[FreeTextEntry1] : Patient presents for routine reevaluation of his right lower extremity superficial phlebitis.  He denies significant discomfort related to this limb but does note an area in the distal aspect of the right posterior lateral calf very mild tenderness.

## 2023-01-11 NOTE — ASSESSMENT
[FreeTextEntry1] : 62-year-old with episode of superficial phlebitis of the branch of the greater saphenous vein but no evidence of DVT or extension of propagation of the initial process.\par \par I noted my findings to the patient and suggested follow-up with me as necessary.\par \par He was instructed to contact me immediately should either limb become edematous painful or tender.

## 2023-01-11 NOTE — PHYSICAL EXAM
[FreeTextEntry1] : 2+ dorsalis pedis pulses bilaterally; both feet pink, warm, well-perfused without ulcers, cyanosis, or gangrenous changes; no edema or Homans' sign bilaterally; small (approximately 2 cm) area on the right posterior lateral calf of mild induration without significant tenderness; no erythema of the overlying skin; no tenderness or induration of the medial aspect of either calf or thigh

## 2023-08-03 ENCOUNTER — APPOINTMENT (OUTPATIENT)
Dept: GASTROENTEROLOGY | Facility: CLINIC | Age: 63
End: 2023-08-03
Payer: COMMERCIAL

## 2023-08-03 VITALS
DIASTOLIC BLOOD PRESSURE: 76 MMHG | HEART RATE: 72 BPM | WEIGHT: 211 LBS | BODY MASS INDEX: 27.96 KG/M2 | HEIGHT: 73 IN | SYSTOLIC BLOOD PRESSURE: 126 MMHG

## 2023-08-03 DIAGNOSIS — Z12.11 ENCOUNTER FOR SCREENING FOR MALIGNANT NEOPLASM OF COLON: ICD-10-CM

## 2023-08-03 PROCEDURE — 99203 OFFICE O/P NEW LOW 30 MIN: CPT

## 2023-08-03 RX ORDER — SODIUM PICOSULFATE, MAGNESIUM OXIDE, AND ANHYDROUS CITRIC ACID 10; 3.5; 12 MG/160ML; G/160ML; G/160ML
10-3.5-12 MG-GM LIQUID ORAL
Qty: 1 | Refills: 0 | Status: ACTIVE | COMMUNITY
Start: 2023-08-03 | End: 1900-01-01

## 2023-08-06 NOTE — HISTORY OF PRESENT ILLNESS
[FreeTextEntry1] : 63yo male for screening colonoscopy  Patient presents prior to screening colonoscopy.  Patient is asymptomatic without bleeding or change in bowel habits.  No family history of colon polyps or cancer.

## 2023-08-06 NOTE — ASSESSMENT
[FreeTextEntry1] : 63yo male for screening colonoscopy  Will check colonoscopy with clenpiq Risks and benefits of procedure(s) discussed with patient in detail, including but not limited to, perforation, bleeding, reaction to anesthesia, missed lesions.

## 2023-10-31 ENCOUNTER — APPOINTMENT (OUTPATIENT)
Dept: GASTROENTEROLOGY | Facility: AMBULATORY MEDICAL SERVICES | Age: 63
End: 2023-10-31
Payer: COMMERCIAL

## 2023-10-31 ENCOUNTER — RESULT REVIEW (OUTPATIENT)
Age: 63
End: 2023-10-31

## 2023-10-31 PROCEDURE — 45380 COLONOSCOPY AND BIOPSY: CPT | Mod: GC

## 2024-08-14 ENCOUNTER — APPOINTMENT (OUTPATIENT)
Dept: ORTHOPEDIC SURGERY | Facility: CLINIC | Age: 64
End: 2024-08-14

## 2024-08-14 VITALS
HEART RATE: 58 BPM | DIASTOLIC BLOOD PRESSURE: 70 MMHG | BODY MASS INDEX: 27.96 KG/M2 | HEIGHT: 73 IN | SYSTOLIC BLOOD PRESSURE: 126 MMHG | WEIGHT: 211 LBS

## 2024-08-14 DIAGNOSIS — M17.11 UNILATERAL PRIMARY OSTEOARTHRITIS, RIGHT KNEE: ICD-10-CM

## 2024-08-14 DIAGNOSIS — Z86.39 PERSONAL HISTORY OF OTHER ENDOCRINE, NUTRITIONAL AND METABOLIC DISEASE: ICD-10-CM

## 2024-08-14 DIAGNOSIS — Z78.9 OTHER SPECIFIED HEALTH STATUS: ICD-10-CM

## 2024-08-14 DIAGNOSIS — Z86.79 PERSONAL HISTORY OF OTHER DISEASES OF THE CIRCULATORY SYSTEM: ICD-10-CM

## 2024-08-14 DIAGNOSIS — Z86.718 PERSONAL HISTORY OF OTHER VENOUS THROMBOSIS AND EMBOLISM: ICD-10-CM

## 2024-08-14 PROCEDURE — 73560 X-RAY EXAM OF KNEE 1 OR 2: CPT | Mod: RT

## 2024-08-14 PROCEDURE — 20610 DRAIN/INJ JOINT/BURSA W/O US: CPT | Mod: RT

## 2024-08-14 PROCEDURE — 99203 OFFICE O/P NEW LOW 30 MIN: CPT | Mod: 25

## 2024-08-15 RX ORDER — HYDRALAZINE HYDROCHLORIDE 100 MG/1
100 TABLET ORAL
Refills: 0 | Status: ACTIVE | COMMUNITY

## 2024-08-15 RX ORDER — ATORVASTATIN CALCIUM 80 MG/1
TABLET, FILM COATED ORAL
Refills: 0 | Status: ACTIVE | COMMUNITY

## 2024-08-15 RX ORDER — METOPROLOL TARTRATE 50 MG/1
TABLET ORAL
Refills: 0 | Status: ACTIVE | COMMUNITY

## 2024-08-19 PROBLEM — M17.11 PRIMARY OSTEOARTHRITIS OF RIGHT KNEE: Status: ACTIVE | Noted: 2024-08-19

## 2024-08-19 PROBLEM — Z86.39 HISTORY OF HIGH CHOLESTEROL: Status: RESOLVED | Noted: 2024-08-15 | Resolved: 2024-08-19

## 2024-08-19 PROBLEM — Z78.9 ALCOHOL USE: Status: ACTIVE | Noted: 2024-08-15

## 2024-08-19 PROBLEM — Z86.718 HISTORY OF BLOOD CLOTS: Status: RESOLVED | Noted: 2024-08-15 | Resolved: 2024-08-19

## 2024-08-19 PROBLEM — Z86.79 HISTORY OF HYPERTENSION: Status: RESOLVED | Noted: 2024-08-15 | Resolved: 2024-08-19

## 2024-08-19 NOTE — DISCUSSION/SUMMARY
[de-identified] : At this time, due to patellofemoral arthritis of the right knee, the patient was given a cortisone injection.  I recommend ice, elevation and reassessment in 3-4 weeks.

## 2024-08-19 NOTE — PHYSICAL EXAM
[de-identified] : Right Knee: Knee: Range of Motion in Degrees	 	                  Claimant:	Normal:	 Flexion Active	  135 	                135-degrees	 Flexion Passive	  135	                135-degrees	 Extension Active	  0-5	                0-5-degrees	 Extension Passive	  0-5	                0-5-degrees  No weakness to flexion/extension.  No evidence of instability in the AP plane or varus or valgus stress.  Negative  Lachman.  Negative pivot shift.  Negative anterior drawer test.  Negative posterior drawer test.  Negative Jesenia.  Negative Apley grind.  No medial or lateral joint line tenderness.  Positive tenderness over the lateral facet of the patella.  No tenderness over the medial facet of the patella.  Positive patellofemoral crepitations.  No lateral tilting patella.  No patella apprehension.  No crepitation in the medial and lateral femoral condyle.  No proximal or distal swelling, edema or tenderness.  No gross motor or sensory deficits.  Moderate effusion.  2+ DP and PT pulses.  No varus or valgus malalignment.  Skin is intact.  No rashes, scars or lesions.     Left Knee: Knee: Range of Motion in Degrees	 	                  Claimant:	Normal:	 Flexion Active	  135 	                135-degrees	 Flexion Passive	  135	                135-degrees	 Extension Active	  0-5	                0-5-degrees	 Extension Passive	  0-5	                0-5-degrees	  No weakness to flexion/extension.  No evidence of instability in the AP plane or varus or valgus stress.  Negative  Lachman.  Negative pivot shift.  Negative anterior drawer test.  Negative posterior drawer test.  Negative Jesenia.  Negative Apley grind.  No medial or lateral joint line tenderness.  No tenderness over the medial and lateral facet of the patella.  No patellofemoral crepitations.  No lateral tilting patella.  No patellar apprehension.  No crepitation in the medial and lateral femoral condyle.  No proximal or distal swelling, edema or tenderness.  No gross motor or sensory deficits.  No intra-articular swelling.  2+ DP and PT pulses. No varus or valgus malalignment.  Skin is intact.  No rashes, scars or lesions.     [de-identified] : Gait and Station:  Ambulating with a slightly antalgic to antalgic gait.  Station:  Normal.  [de-identified] : Appearance:  Well-developed, well-nourished male in no acute distress.   [de-identified] : Radiographs, one to two views of the right knee taken in the office today, show mild patellofemoral degenerative changes.

## 2024-08-19 NOTE — HISTORY OF PRESENT ILLNESS
[de-identified] : The patient comes in today stating he is not sure what he had done, but his knee was swollen.  It is down now, but he feels tightness, feels like something is going to tear.  The patient states the onset/injury occurred 08/10/2024.  This injury is not work related or due to an automobile accident.

## 2024-08-19 NOTE — PROCEDURE
[de-identified] : Indication: Patellofemoral arthritis right knee   Consent: At this time, I have recommended an injection to the right knee.  The risks and benefits of the procedure were discussed with the patient in detail.  Upon verbal consent of the patient, we proceeded with the injection as noted below.   Description of Procedure: After a sterile prep, the patient underwent an injection of approximately 9 mL of 1% Lidocaine (10 mg/mL) without epinephrine and 1 mL of triamcinolone acetonide (40 mg/mL) into the right knee.  The patient tolerated the procedure well.  There were no complications.   :  [employer] Drug Name:  Triamcinolone Acetonide Injectable Suspension USP NCD#:  [address]-2 Lot#:  [id number] Expiration Date:  08/31/2025

## 2024-08-19 NOTE — DISCUSSION/SUMMARY
[de-identified] : At this time, due to patellofemoral arthritis of the right knee, the patient was given a cortisone injection.  I recommend ice, elevation and reassessment in 3-4 weeks.

## 2024-08-19 NOTE — PHYSICAL EXAM
[de-identified] : Right Knee: Knee: Range of Motion in Degrees	 	                  Claimant:	Normal:	 Flexion Active	  135 	                135-degrees	 Flexion Passive	  135	                135-degrees	 Extension Active	  0-5	                0-5-degrees	 Extension Passive	  0-5	                0-5-degrees  No weakness to flexion/extension.  No evidence of instability in the AP plane or varus or valgus stress.  Negative  Lachman.  Negative pivot shift.  Negative anterior drawer test.  Negative posterior drawer test.  Negative Jesenia.  Negative Apley grind.  No medial or lateral joint line tenderness.  Positive tenderness over the lateral facet of the patella.  No tenderness over the medial facet of the patella.  Positive patellofemoral crepitations.  No lateral tilting patella.  No patella apprehension.  No crepitation in the medial and lateral femoral condyle.  No proximal or distal swelling, edema or tenderness.  No gross motor or sensory deficits.  Moderate effusion.  2+ DP and PT pulses.  No varus or valgus malalignment.  Skin is intact.  No rashes, scars or lesions.     Left Knee: Knee: Range of Motion in Degrees	 	                  Claimant:	Normal:	 Flexion Active	  135 	                135-degrees	 Flexion Passive	  135	                135-degrees	 Extension Active	  0-5	                0-5-degrees	 Extension Passive	  0-5	                0-5-degrees	  No weakness to flexion/extension.  No evidence of instability in the AP plane or varus or valgus stress.  Negative  Lachman.  Negative pivot shift.  Negative anterior drawer test.  Negative posterior drawer test.  Negative Jesenia.  Negative Apley grind.  No medial or lateral joint line tenderness.  No tenderness over the medial and lateral facet of the patella.  No patellofemoral crepitations.  No lateral tilting patella.  No patellar apprehension.  No crepitation in the medial and lateral femoral condyle.  No proximal or distal swelling, edema or tenderness.  No gross motor or sensory deficits.  No intra-articular swelling.  2+ DP and PT pulses. No varus or valgus malalignment.  Skin is intact.  No rashes, scars or lesions.     [de-identified] : Gait and Station:  Ambulating with a slightly antalgic to antalgic gait.  Station:  Normal.  [de-identified] : Appearance:  Well-developed, well-nourished male in no acute distress.   [de-identified] : Radiographs, one to two views of the right knee taken in the office today, show mild patellofemoral degenerative changes.

## 2024-08-19 NOTE — HISTORY OF PRESENT ILLNESS
[de-identified] : The patient comes in today stating he is not sure what he had done, but his knee was swollen.  It is down now, but he feels tightness, feels like something is going to tear.  The patient states the onset/injury occurred 08/10/2024.  This injury is not work related or due to an automobile accident.

## 2024-08-19 NOTE — CONSULT LETTER
[Dear  ___] : Dear  [unfilled], [Consult Letter:] : I had the pleasure of evaluating your patient, [unfilled]. [Please see my note below.] : Please see my note below. [Consult Closing:] : Thank you very much for allowing me to participate in the care of this patient.  If you have any questions, please do not hesitate to contact me. [Sincerely,] : Sincerely, [FreeTextEntry3] : Isiah Johns, III, MD

## 2024-08-19 NOTE — ADDENDUM
[FreeTextEntry1] : This note was written by Nery Granados on 08/19/2024 acting as scribe for Isiah Johns III, MD

## 2024-09-04 ENCOUNTER — APPOINTMENT (OUTPATIENT)
Dept: ORTHOPEDIC SURGERY | Facility: CLINIC | Age: 64
End: 2024-09-04